# Patient Record
Sex: FEMALE
[De-identification: names, ages, dates, MRNs, and addresses within clinical notes are randomized per-mention and may not be internally consistent; named-entity substitution may affect disease eponyms.]

---

## 2019-06-16 ENCOUNTER — HOSPITAL ENCOUNTER (OUTPATIENT)
Dept: HOSPITAL 56 - MW.ED | Age: 49
Setting detail: OBSERVATION
LOS: 1 days | Discharge: HOME | End: 2019-06-17
Attending: INTERNAL MEDICINE | Admitting: INTERNAL MEDICINE
Payer: MEDICAID

## 2019-06-16 DIAGNOSIS — F17.210: ICD-10-CM

## 2019-06-16 DIAGNOSIS — F19.10: ICD-10-CM

## 2019-06-16 DIAGNOSIS — E86.0: ICD-10-CM

## 2019-06-16 DIAGNOSIS — G93.40: ICD-10-CM

## 2019-06-16 DIAGNOSIS — Z79.899: ICD-10-CM

## 2019-06-16 DIAGNOSIS — F41.9: ICD-10-CM

## 2019-06-16 DIAGNOSIS — Z23: ICD-10-CM

## 2019-06-16 DIAGNOSIS — N30.00: Primary | ICD-10-CM

## 2019-06-16 LAB
APAP SERPL-MCNC: <2 UG/ML
CHLORIDE SERPL-SCNC: 103 MMOL/L (ref 98–107)
SODIUM SERPL-SCNC: 137 MMOL/L (ref 136–145)

## 2019-06-16 PROCEDURE — 96372 THER/PROPH/DIAG INJ SC/IM: CPT

## 2019-06-16 PROCEDURE — 90715 TDAP VACCINE 7 YRS/> IM: CPT

## 2019-06-16 PROCEDURE — A4217 STERILE WATER/SALINE, 500 ML: HCPCS

## 2019-06-16 PROCEDURE — 71045 X-RAY EXAM CHEST 1 VIEW: CPT

## 2019-06-16 PROCEDURE — 96361 HYDRATE IV INFUSION ADD-ON: CPT

## 2019-06-16 PROCEDURE — 87389 HIV-1 AG W/HIV-1&-2 AB AG IA: CPT

## 2019-06-16 PROCEDURE — 85025 COMPLETE CBC W/AUTO DIFF WBC: CPT

## 2019-06-16 PROCEDURE — 90471 IMMUNIZATION ADMIN: CPT

## 2019-06-16 PROCEDURE — 80074 ACUTE HEPATITIS PANEL: CPT

## 2019-06-16 PROCEDURE — G0378 HOSPITAL OBSERVATION PER HR: HCPCS

## 2019-06-16 PROCEDURE — 80305 DRUG TEST PRSMV DIR OPT OBS: CPT

## 2019-06-16 PROCEDURE — 36415 COLL VENOUS BLD VENIPUNCTURE: CPT

## 2019-06-16 PROCEDURE — 96375 TX/PRO/DX INJ NEW DRUG ADDON: CPT

## 2019-06-16 PROCEDURE — 87088 URINE BACTERIA CULTURE: CPT

## 2019-06-16 PROCEDURE — 82553 CREATINE MB FRACTION: CPT

## 2019-06-16 PROCEDURE — 81001 URINALYSIS AUTO W/SCOPE: CPT

## 2019-06-16 PROCEDURE — 96365 THER/PROPH/DIAG IV INF INIT: CPT

## 2019-06-16 PROCEDURE — G0480 DRUG TEST DEF 1-7 CLASSES: HCPCS

## 2019-06-16 PROCEDURE — 87186 SC STD MICRODIL/AGAR DIL: CPT

## 2019-06-16 PROCEDURE — 87040 BLOOD CULTURE FOR BACTERIA: CPT

## 2019-06-16 PROCEDURE — 99285 EMERGENCY DEPT VISIT HI MDM: CPT

## 2019-06-16 PROCEDURE — 70450 CT HEAD/BRAIN W/O DYE: CPT

## 2019-06-16 PROCEDURE — 96366 THER/PROPH/DIAG IV INF ADDON: CPT

## 2019-06-16 PROCEDURE — 80053 COMPREHEN METABOLIC PANEL: CPT

## 2019-06-16 PROCEDURE — 87086 URINE CULTURE/COLONY COUNT: CPT

## 2019-06-16 PROCEDURE — 85610 PROTHROMBIN TIME: CPT

## 2019-06-16 RX ADMIN — DEXTROSE SCH: 10 SOLUTION INTRAVENOUS at 15:46

## 2019-06-16 RX ADMIN — DEXTROSE SCH: 10 SOLUTION INTRAVENOUS at 22:23

## 2019-06-16 NOTE — EDM.PDOC
ED HPI GENERAL MEDICAL PROBLEM





- General


Chief Complaint: General


Stated Complaint: MENTAL EVAL


Time Seen by Provider: 06/16/19 10:16


Source of Information: Reports: Patient


History Limitations: Reports: No Limitations





- History of Present Illness


INITIAL COMMENTS - FREE TEXT/NARRATIVE: 


HISTORY AND PHYSICAL:





History of present illness:


Patient is brought to the emergency room by ambulance and enforcement after 

[place]. The  reports she was 

attempting to get into other people's homes. Upon EMS arrival patient is 

ambulatory, alert but not answering questions or answering them appropriately. 

Uncooperative and resisting assistance. First responders did note she was 

walking around barefoot and does have some scattered bruising throughout her 

face and body. Voiced concern of drug abuse. Unable to get full history or 

current concerns from patient at this time.





Review of systems: 


As per history of present illness and below otherwise all systems reviewed and 

negative.





Past medical history: 


As per history of present illness and as reviewed below otherwise 

noncontributory.





Surgical history: 


As per history of present illness and as reviewed below otherwise 

noncontributory.





Social history: 


See social history for further information





Family history: 


As per history of present illness and as reviewed below otherwise 

noncontributory.





Physical exam:


General: Thin and unkempt female. Difficult to assess due to patient's limited 

verbal response. Vital signs are stable and have been reviewed by me.


HEENT: Bruising to face, nontender, normocephalic, pupils equal and reactive 

bilaterally, negative for conjunctival pallor or scleral icterus, mucous 

membranes moist, TMs normal bilaterally, throat clear, neck supple, nontender, 

trachea midline. No drooling or trismus noted. No meningeal signs. No hot 

potato voice noted. 


Lungs: Clear to auscultation, breath sounds equal bilaterally, chest nontender.


Heart: S1S2, regular rate and rhythm without overt murmur


Abdomen: Soft, nondistended, extremely thin, nontender. Negative for masses or 

hepatosplenomegaly. Negative for costovertebral tenderness.


Pelvis: Stable nontender.


Genitourinary/Rectal: Deferred.


Skin: Various scattered bruising throughout. No lesions or rashes noted.


C-spine/Back: No pinpoint vertebral tenderness upon palpation. No crepitus, step

-offs or obvious deformities. Patient was ambulatory prior to arrival. She is 

not incontinent of urine or stool.


Extremities: Atraumatic, moves all extremities per self without difficulty or 

deficits, negative for cords or calf pain. Neurovascular unremarkable.


Neuro: Awake, alert, oriented. Cranial nerves II through XII unremarkable. 

Cerebellum unremarkable. Motor and sensory unremarkable throughout. Exam 

nonfocal.





Notes:


Patient is restless and uncooperative with allowing nursing staff to assist 

her. She is aware and agreeable for lab work, imaging and IV medications.





Lab work shows that she does have a urinary tract infection. We'll hang IV 

Rocephin. Patient's chest x-ray and head CT showed no acute findings. Patient 

continues to be restless and non-cooperative with answering questions. Vital 

signs remain stable.





Diagnostics:


CBC, CMP, INR, UA, Drug Screening, Acetaminophen, Salicylate, chest x-ray, head 

CT





Therapeutics:


IV fluids, Benadryl, Haldol, Rocephin





Impression: 


Altered Mental Status


Substance Use


Encounter for Medical Screening


UTI





Plan:


Observation admission to ICU





Definitive disposition and diagnosis as appropriate pending reevaluation and 

review of above.








- Related Data


 Allergies











Allergy/AdvReac Type Severity Reaction Status Date / Time


 


No Known Allergies Allergy   Verified 06/16/19 10:28











Home Meds: 


 Home Meds





busPIRone [Buspar] 10 mg PO 06/16/19 [History]











Past Medical History


HEENT History: Reports: None


Cardiovascular History: Reports: None


Respiratory History: Reports: None


Gastrointestinal History: Reports: None


Genitourinary History: Reports: None


OB/GYN History: Reports: None


Musculoskeletal History: Reports: None


Neurological History: Reports: None


Psychiatric History: Reports: Anxiety


Endocrine/Metabolic History: Reports: None


Hematologic History: Reports: None


Immunologic History: Reports: None


Oncologic (Cancer) History: Reports: None


Dermatologic History: Reports: None





- Infectious Disease History


Infectious Disease History: Reports: None





- Past Surgical History


Head Surgeries/Procedures: Reports: None


Female  Surgical History: Reports: None





Social & Family History





- Tobacco Use


Smoking Status *Q: Current Some Day Smoker


Years of Tobacco use: 20


Packs/Tins Daily: 1





- Caffeine Use


Caffeine Use: Reports: None





- Alcohol Use


Days Per Week of Alcohol Use: 7


Number of Drinks Per Day: 2


Total Drinks Per Week: 14





- Recreational Drug Use


Recreational Drug Use: Yes





ED ROS GENERAL





- Review of Systems


Review Of Systems: ROS reveals no pertinent complaints other than HPI.





ED EXAM, GENERAL





- Physical Exam


Exam: See Below (See dictation)





Course





- Vital Signs


Last Recorded V/S: 


 Last Vital Signs











Temp      


 


Pulse  70   06/16/19 10:51


 


Resp  18   06/16/19 10:51


 


BP  124/74   06/16/19 10:51


 


Pulse Ox  95   06/16/19 10:51














- Orders/Labs/Meds


Orders: 


 Active Orders 24 hr











 Category Date Time Status


 


 Admission Status [Patient Status] [ADT] Stat ADT  06/16/19 12:19 Active


 


 Vaccines to be Administered [RC] PER UNIT ROUTINE Care  06/16/19 10:19 Active


 


 CULTURE URINE [RM] Stat Lab  06/16/19 11:06 Received











Labs: 


 Laboratory Tests











  06/16/19 06/16/19 06/16/19 Range/Units





  10:23 10:23 10:23 


 


WBC  8.55    (4.0-11.0)  K/uL


 


RBC  4.43    (4.30-5.90)  M/uL


 


Hgb  12.1    (12.0-16.0)  g/dL


 


Hct  36.9    (36.0-46.0)  %


 


MCV  83.3    (80.0-98.0)  fL


 


MCH  27.3    (27.0-32.0)  pg


 


MCHC  32.8    (31.0-37.0)  g/dL


 


RDW Std Deviation  55.7    (28.0-62.0)  fl


 


RDW Coeff of Crow  18 H    (11.0-15.0)  %


 


Plt Count  296    (150-400)  K/uL


 


MPV  9.20    (7.40-12.00)  fL


 


Neut % (Auto)  63.3    (48.0-80.0)  %


 


Lymph % (Auto)  27.1    (16.0-40.0)  %


 


Mono % (Auto)  8.5    (0.0-15.0)  %


 


Eos % (Auto)  0.7    (0.0-7.0)  %


 


Baso % (Auto)  0.4    (0.0-1.5)  %


 


Neut # (Auto)  5.4    (1.4-5.7)  K/uL


 


Lymph # (Auto)  2.3    (0.6-2.4)  K/uL


 


Mono # (Auto)  0.7    (0.0-0.8)  K/uL


 


Eos # (Auto)  0.1    (0.0-0.7)  K/uL


 


Baso # (Auto)  0.0    (0.0-0.1)  K/uL


 


Nucleated RBC %  0.0    /100WBC


 


Nucleated RBCs #  0    K/uL


 


INR   1.01   


 


Sodium    137  (136-145)  mmol/L


 


Potassium    4.3  (3.5-5.1)  mmol/L


 


Chloride    103  ()  mmol/L


 


Carbon Dioxide    26.9  (21.0-32.0)  mmol/L


 


BUN    23 H  (7.0-18.0)  mg/dL


 


Creatinine    1.0  (0.6-1.0)  mg/dL


 


Est Cr Clr Drug Dosing    15.27  mL/min


 


Estimated GFR (MDRD)    49.2  ml/min


 


Glucose    102  ()  mg/dL


 


Calcium    9.3  (8.5-10.1)  mg/dL


 


Total Bilirubin    1.2 H  (0.2-1.0)  mg/dL


 


AST    26  (15-37)  IU/L


 


ALT    28  (14-63)  IU/L


 


Alkaline Phosphatase    121 H  ()  U/L


 


Total Protein    7.6  (6.4-8.2)  g/dL


 


Albumin    4.3  (3.4-5.0)  g/dL


 


Globulin    3.3  (2.6-4.0)  g/dL


 


Albumin/Globulin Ratio    1.3  (0.9-1.6)  


 


Urine Color     


 


Urine Appearance     


 


Urine pH     (5.0-8.0)  


 


Ur Specific Gravity     (1.001-1.035)  


 


Urine Protein     (NEGATIVE)  mg/dL


 


Urine Glucose (UA)     (NEGATIVE)  mg/dL


 


Urine Ketones     (NEGATIVE)  mg/dL


 


Urine Occult Blood     (NEGATIVE)  


 


Urine Nitrite     (NEGATIVE)  


 


Urine Bilirubin     (NEGATIVE)  


 


Urine Urobilinogen     (<2.0)  EU/dL


 


Ur Leukocyte Esterase     (NEGATIVE)  


 


Urine RBC     (0-2/HPF)  


 


Urine WBC     (0-5/HPF)  


 


Amorphous Sediment     (NEGATIVE)  


 


Urine Bacteria     (NEGATIVE)  


 


Salicylates     (0-20)  mg/dL


 


Urine Opiates Screen     (NEGATIVE)  


 


Ur Oxycodone Screen     (NEGATIVE)  


 


Urine Methadone Screen     (NEGATIVE)  


 


Acetaminophen     ug/mL


 


Ur Barbiturates Screen     (NEGATIVE)  


 


Ur Phencyclidine Scrn     (NEGATIVE)  


 


Ur Amphetamine Screen     (NEGATIVE)  


 


U Methamphetamines Scrn     (NEGATIVE)  


 


U Benzodiazepines Scrn     (NEGATIVE)  


 


U Cocaine Metab Screen     (NEGATIVE)  


 


U Marijuana (THC) Screen     (NEGATIVE)  


 


Ethyl Alcohol     mg/dL














  06/16/19 06/16/19 06/16/19 Range/Units





  10:23 10:23 11:06 


 


WBC     (4.0-11.0)  K/uL


 


RBC     (4.30-5.90)  M/uL


 


Hgb     (12.0-16.0)  g/dL


 


Hct     (36.0-46.0)  %


 


MCV     (80.0-98.0)  fL


 


MCH     (27.0-32.0)  pg


 


MCHC     (31.0-37.0)  g/dL


 


RDW Std Deviation     (28.0-62.0)  fl


 


RDW Coeff of Crow     (11.0-15.0)  %


 


Plt Count     (150-400)  K/uL


 


MPV     (7.40-12.00)  fL


 


Neut % (Auto)     (48.0-80.0)  %


 


Lymph % (Auto)     (16.0-40.0)  %


 


Mono % (Auto)     (0.0-15.0)  %


 


Eos % (Auto)     (0.0-7.0)  %


 


Baso % (Auto)     (0.0-1.5)  %


 


Neut # (Auto)     (1.4-5.7)  K/uL


 


Lymph # (Auto)     (0.6-2.4)  K/uL


 


Mono # (Auto)     (0.0-0.8)  K/uL


 


Eos # (Auto)     (0.0-0.7)  K/uL


 


Baso # (Auto)     (0.0-0.1)  K/uL


 


Nucleated RBC %     /100WBC


 


Nucleated RBCs #     K/uL


 


INR     


 


Sodium     (136-145)  mmol/L


 


Potassium     (3.5-5.1)  mmol/L


 


Chloride     ()  mmol/L


 


Carbon Dioxide     (21.0-32.0)  mmol/L


 


BUN     (7.0-18.0)  mg/dL


 


Creatinine     (0.6-1.0)  mg/dL


 


Est Cr Clr Drug Dosing     mL/min


 


Estimated GFR (MDRD)     ml/min


 


Glucose     ()  mg/dL


 


Calcium     (8.5-10.1)  mg/dL


 


Total Bilirubin     (0.2-1.0)  mg/dL


 


AST     (15-37)  IU/L


 


ALT     (14-63)  IU/L


 


Alkaline Phosphatase     ()  U/L


 


Total Protein     (6.4-8.2)  g/dL


 


Albumin     (3.4-5.0)  g/dL


 


Globulin     (2.6-4.0)  g/dL


 


Albumin/Globulin Ratio     (0.9-1.6)  


 


Urine Color    YELLOW  


 


Urine Appearance    CLEAR  


 


Urine pH    7.0  (5.0-8.0)  


 


Ur Specific Gravity    1.015  (1.001-1.035)  


 


Urine Protein    NEGATIVE  (NEGATIVE)  mg/dL


 


Urine Glucose (UA)    NEGATIVE  (NEGATIVE)  mg/dL


 


Urine Ketones    TRACE H  (NEGATIVE)  mg/dL


 


Urine Occult Blood    NEGATIVE  (NEGATIVE)  


 


Urine Nitrite    POSITIVE H  (NEGATIVE)  


 


Urine Bilirubin    NEGATIVE  (NEGATIVE)  


 


Urine Urobilinogen    0.2  (<2.0)  EU/dL


 


Ur Leukocyte Esterase    SMALL H  (NEGATIVE)  


 


Urine RBC    NONE SEEN  (0-2/HPF)  


 


Urine WBC    15-25  (0-5/HPF)  


 


Amorphous Sediment    MODERATE  (NEGATIVE)  


 


Urine Bacteria    4+ H  (NEGATIVE)  


 


Salicylates   4.8   (0-20)  mg/dL


 


Urine Opiates Screen     (NEGATIVE)  


 


Ur Oxycodone Screen     (NEGATIVE)  


 


Urine Methadone Screen     (NEGATIVE)  


 


Acetaminophen   <2.0   ug/mL


 


Ur Barbiturates Screen     (NEGATIVE)  


 


Ur Phencyclidine Scrn     (NEGATIVE)  


 


Ur Amphetamine Screen     (NEGATIVE)  


 


U Methamphetamines Scrn     (NEGATIVE)  


 


U Benzodiazepines Scrn     (NEGATIVE)  


 


U Cocaine Metab Screen     (NEGATIVE)  


 


U Marijuana (THC) Screen     (NEGATIVE)  


 


Ethyl Alcohol  < 3.0    mg/dL














  06/16/19 Range/Units





  11:06 


 


WBC   (4.0-11.0)  K/uL


 


RBC   (4.30-5.90)  M/uL


 


Hgb   (12.0-16.0)  g/dL


 


Hct   (36.0-46.0)  %


 


MCV   (80.0-98.0)  fL


 


MCH   (27.0-32.0)  pg


 


MCHC   (31.0-37.0)  g/dL


 


RDW Std Deviation   (28.0-62.0)  fl


 


RDW Coeff of Crow   (11.0-15.0)  %


 


Plt Count   (150-400)  K/uL


 


MPV   (7.40-12.00)  fL


 


Neut % (Auto)   (48.0-80.0)  %


 


Lymph % (Auto)   (16.0-40.0)  %


 


Mono % (Auto)   (0.0-15.0)  %


 


Eos % (Auto)   (0.0-7.0)  %


 


Baso % (Auto)   (0.0-1.5)  %


 


Neut # (Auto)   (1.4-5.7)  K/uL


 


Lymph # (Auto)   (0.6-2.4)  K/uL


 


Mono # (Auto)   (0.0-0.8)  K/uL


 


Eos # (Auto)   (0.0-0.7)  K/uL


 


Baso # (Auto)   (0.0-0.1)  K/uL


 


Nucleated RBC %   /100WBC


 


Nucleated RBCs #   K/uL


 


INR   


 


Sodium   (136-145)  mmol/L


 


Potassium   (3.5-5.1)  mmol/L


 


Chloride   ()  mmol/L


 


Carbon Dioxide   (21.0-32.0)  mmol/L


 


BUN   (7.0-18.0)  mg/dL


 


Creatinine   (0.6-1.0)  mg/dL


 


Est Cr Clr Drug Dosing   mL/min


 


Estimated GFR (MDRD)   ml/min


 


Glucose   ()  mg/dL


 


Calcium   (8.5-10.1)  mg/dL


 


Total Bilirubin   (0.2-1.0)  mg/dL


 


AST   (15-37)  IU/L


 


ALT   (14-63)  IU/L


 


Alkaline Phosphatase   ()  U/L


 


Total Protein   (6.4-8.2)  g/dL


 


Albumin   (3.4-5.0)  g/dL


 


Globulin   (2.6-4.0)  g/dL


 


Albumin/Globulin Ratio   (0.9-1.6)  


 


Urine Color   


 


Urine Appearance   


 


Urine pH   (5.0-8.0)  


 


Ur Specific Gravity   (1.001-1.035)  


 


Urine Protein   (NEGATIVE)  mg/dL


 


Urine Glucose (UA)   (NEGATIVE)  mg/dL


 


Urine Ketones   (NEGATIVE)  mg/dL


 


Urine Occult Blood   (NEGATIVE)  


 


Urine Nitrite   (NEGATIVE)  


 


Urine Bilirubin   (NEGATIVE)  


 


Urine Urobilinogen   (<2.0)  EU/dL


 


Ur Leukocyte Esterase   (NEGATIVE)  


 


Urine RBC   (0-2/HPF)  


 


Urine WBC   (0-5/HPF)  


 


Amorphous Sediment   (NEGATIVE)  


 


Urine Bacteria   (NEGATIVE)  


 


Salicylates   (0-20)  mg/dL


 


Urine Opiates Screen  NEGATIVE  (NEGATIVE)  


 


Ur Oxycodone Screen  NEGATIVE  (NEGATIVE)  


 


Urine Methadone Screen  NEGATIVE  (NEGATIVE)  


 


Acetaminophen   ug/mL


 


Ur Barbiturates Screen  NEGATIVE  (NEGATIVE)  


 


Ur Phencyclidine Scrn  NEGATIVE  (NEGATIVE)  


 


Ur Amphetamine Screen  NEGATIVE  (NEGATIVE)  


 


U Methamphetamines Scrn  POSITIVE  (NEGATIVE)  


 


U Benzodiazepines Scrn  NEGATIVE  (NEGATIVE)  


 


U Cocaine Metab Screen  NEGATIVE  (NEGATIVE)  


 


U Marijuana (THC) Screen  NEGATIVE  (NEGATIVE)  


 


Ethyl Alcohol   mg/dL











Meds: 


Medications














Discontinued Medications














Generic Name Dose Route Start Last Admin





  Trade Name Freq  PRN Reason Stop Dose Admin


 


Diphenhydramine HCl  50 mg  06/16/19 10:28  06/16/19 10:41





  Benadryl  IVPUSH  06/16/19 10:29  50 mg





  ONETIME ONE   Administration





     





     





     





     


 


Diphtheria/Tetanus/Acell Pertussis  0.5 ml  06/16/19 10:18  06/16/19 10:41





  Adacel  IM  06/16/19 10:19  0.5 ml





  .ONCE ONE   Administration





     





     





     





     


 


Haloperidol Lactate  10 mg  06/16/19 10:29  06/16/19 10:49





  Haldol  IM  06/16/19 10:30  10 mg





  ONETIME ONE   Administration





     





     





     





     


 


Sodium Chloride  1,000 mls @ 999 mls/hr  06/16/19 10:15  06/16/19 10:30





  Normal Saline  IV  06/16/19 11:15  999 mls/hr





  STAT ONE   Administration





     





     





     





     


 


Ceftriaxone Sodium/Dextrose 1  50 mls @ 100 mls/hr  06/16/19 11:38  06/16/19 12:

17





  gm/ Premix  IV  06/16/19 12:07  100 mls/hr





  ONETIME ONE   Administration





     





     





     





     














Departure





- Departure


Time of Disposition: 12:53


Disposition: Refer to Observation


Clinical Impression: 


 Substance abuse, Encounter for medical screening examination





Altered mental status


Qualifiers:


 Altered mental status type: unspecified Qualified Code(s): R41.82 - Altered 

mental status, unspecified





UTI (urinary tract infection)


Qualifiers:


 Urinary tract infection type: acute cystitis Hematuria presence: without 

hematuria Qualified Code(s): N30.00 - Acute cystitis without hematuria








- Discharge Information





- My Orders


Last 24 Hours: 


My Active Orders





06/16/19 10:19


Vaccines to be Administered [RC] PER UNIT ROUTINE 





06/16/19 11:06


CULTURE URINE [RM] Stat 





06/16/19 12:19


Admission Status [Patient Status] [ADT] Stat 














- Assessment/Plan


Last 24 Hours: 


My Active Orders





06/16/19 10:19


Vaccines to be Administered [RC] PER UNIT ROUTINE 





06/16/19 11:06


CULTURE URINE [RM] Stat 





06/16/19 12:19


Admission Status [Patient Status] [ADT] Stat

## 2019-06-16 NOTE — PCM.HP
<Gabriel Hogan - Last Filed: 06/16/19 14:20>





H&P History of Present Illness





- General


Date of Service: 06/16/19


Admit Problem/Dx: 


 Admission Diagnosis/Problem





Admission Diagnosis/Problem      Altered mental status











- History of Present Illness


Initial Comments - Free Text/Narative: 





59 y/o female with history of polysubstance abuse who was brought in to the ER 

by 's Department who found her wondering [place] trying 

to break in. Initially, she was altered when she presented to the ER. However, 

after some time in the ER, she became more alert and oriented. She states she 

lives in [place], [place] and came to [place] with her  to get his car. 

In the ER, she urine drug screen was positive for Methamphetamine. In addition, 

she was found to have a UTI, cultures pending. 


Denies any headaches, chest pain, dyspnea, abdominal pain, dysuria, diarrhea.





- Related Data


Allergies/Adverse Reactions: 


 Allergies











Allergy/AdvReac Type Severity Reaction Status Date / Time


 


No Known Allergies Allergy   Verified 06/16/19 10:28











Home Medications: 


 Home Meds





busPIRone [Buspar] 10 mg PO 06/16/19 [History]











Past Medical History


HEENT History: Reports: None


Cardiovascular History: Reports: None


Respiratory History: Reports: None


Gastrointestinal History: Reports: None


Genitourinary History: Reports: None


OB/GYN History: Reports: None


Musculoskeletal History: Reports: None


Neurological History: Reports: None


Psychiatric History: Reports: Anxiety


Endocrine/Metabolic History: Reports: None


Hematologic History: Reports: None


Immunologic History: Reports: None


Oncologic (Cancer) History: Reports: None


Dermatologic History: Reports: None





- Infectious Disease History


Infectious Disease History: Reports: None





- Past Surgical History


Head Surgeries/Procedures: Reports: None


Female  Surgical History: Reports: None





Social & Family History





- Tobacco Use


Smoking Status *Q: Current Some Day Smoker


Years of Tobacco use: 20


Packs/Tins Daily: 1





- Caffeine Use


Caffeine Use: Reports: None





- Alcohol Use


Days Per Week of Alcohol Use: 7


Number of Drinks Per Day: 2


Total Drinks Per Week: 14





- Recreational Drug Use


Recreational Drug Use: Yes





H&P Review of Systems





- Review of Systems:


Review Of Systems: ROS reveals no pertinent complaints other than HPI.





Exam





- Exam


Exam: See Below





- Vital Signs


Vital Signs: 


 Last Vital Signs











Temp      


 


Pulse  70   06/16/19 10:51


 


Resp  18   06/16/19 10:51


 


BP  124/74   06/16/19 10:51


 


Pulse Ox  95   06/16/19 10:51











Weight: 61.689 kg





- Exam


General: Alert, Cooperative


HEENT: Other (dry oral mucosa with poor dentition)


Lungs: Clear to Auscultation, Normal Respiratory Effort.  No: Crackles, Wheezing


Cardiovascular: Regular Rate, Regular Rhythm


GI/Abdominal Exam: Normal Bowel Sounds, Soft, Non-Tender, No Distention


Extremities: No Pedal Edema, Other (there are some scratches on her lower 

extremities)





- Patient Data


Lab Results Last 24 hrs: 


 Laboratory Results - last 24 hr











  06/16/19 06/16/19 06/16/19 Range/Units





  10:23 10:23 10:23 


 


WBC  8.55    (4.0-11.0)  K/uL


 


RBC  4.43    (4.30-5.90)  M/uL


 


Hgb  12.1    (12.0-16.0)  g/dL


 


Hct  36.9    (36.0-46.0)  %


 


MCV  83.3    (80.0-98.0)  fL


 


MCH  27.3    (27.0-32.0)  pg


 


MCHC  32.8    (31.0-37.0)  g/dL


 


RDW Std Deviation  55.7    (28.0-62.0)  fl


 


RDW Coeff of Crow  18 H    (11.0-15.0)  %


 


Plt Count  296    (150-400)  K/uL


 


MPV  9.20    (7.40-12.00)  fL


 


Neut % (Auto)  63.3    (48.0-80.0)  %


 


Lymph % (Auto)  27.1    (16.0-40.0)  %


 


Mono % (Auto)  8.5    (0.0-15.0)  %


 


Eos % (Auto)  0.7    (0.0-7.0)  %


 


Baso % (Auto)  0.4    (0.0-1.5)  %


 


Neut # (Auto)  5.4    (1.4-5.7)  K/uL


 


Lymph # (Auto)  2.3    (0.6-2.4)  K/uL


 


Mono # (Auto)  0.7    (0.0-0.8)  K/uL


 


Eos # (Auto)  0.1    (0.0-0.7)  K/uL


 


Baso # (Auto)  0.0    (0.0-0.1)  K/uL


 


Nucleated RBC %  0.0    /100WBC


 


Nucleated RBCs #  0    K/uL


 


INR   1.01   


 


Sodium    137  (136-145)  mmol/L


 


Potassium    4.3  (3.5-5.1)  mmol/L


 


Chloride    103  ()  mmol/L


 


Carbon Dioxide    26.9  (21.0-32.0)  mmol/L


 


BUN    23 H  (7.0-18.0)  mg/dL


 


Creatinine    1.0  (0.6-1.0)  mg/dL


 


Est Cr Clr Drug Dosing    15.27  mL/min


 


Estimated GFR (MDRD)    49.2  ml/min


 


Glucose    102  ()  mg/dL


 


Calcium    9.3  (8.5-10.1)  mg/dL


 


Total Bilirubin    1.2 H  (0.2-1.0)  mg/dL


 


AST    26  (15-37)  IU/L


 


ALT    28  (14-63)  IU/L


 


Alkaline Phosphatase    121 H  ()  U/L


 


Total Protein    7.6  (6.4-8.2)  g/dL


 


Albumin    4.3  (3.4-5.0)  g/dL


 


Globulin    3.3  (2.6-4.0)  g/dL


 


Albumin/Globulin Ratio    1.3  (0.9-1.6)  


 


Urine Color     


 


Urine Appearance     


 


Urine pH     (5.0-8.0)  


 


Ur Specific Gravity     (1.001-1.035)  


 


Urine Protein     (NEGATIVE)  mg/dL


 


Urine Glucose (UA)     (NEGATIVE)  mg/dL


 


Urine Ketones     (NEGATIVE)  mg/dL


 


Urine Occult Blood     (NEGATIVE)  


 


Urine Nitrite     (NEGATIVE)  


 


Urine Bilirubin     (NEGATIVE)  


 


Urine Urobilinogen     (<2.0)  EU/dL


 


Ur Leukocyte Esterase     (NEGATIVE)  


 


Urine RBC     (0-2/HPF)  


 


Urine WBC     (0-5/HPF)  


 


Amorphous Sediment     (NEGATIVE)  


 


Urine Bacteria     (NEGATIVE)  


 


Salicylates     (0-20)  mg/dL


 


Urine Opiates Screen     (NEGATIVE)  


 


Ur Oxycodone Screen     (NEGATIVE)  


 


Urine Methadone Screen     (NEGATIVE)  


 


Acetaminophen     ug/mL


 


Ur Barbiturates Screen     (NEGATIVE)  


 


Ur Phencyclidine Scrn     (NEGATIVE)  


 


Ur Amphetamine Screen     (NEGATIVE)  


 


U Methamphetamines Scrn     (NEGATIVE)  


 


U Benzodiazepines Scrn     (NEGATIVE)  


 


U Cocaine Metab Screen     (NEGATIVE)  


 


U Marijuana (THC) Screen     (NEGATIVE)  


 


Ethyl Alcohol     mg/dL














  06/16/19 06/16/19 06/16/19 Range/Units





  10:23 10:23 11:06 


 


WBC     (4.0-11.0)  K/uL


 


RBC     (4.30-5.90)  M/uL


 


Hgb     (12.0-16.0)  g/dL


 


Hct     (36.0-46.0)  %


 


MCV     (80.0-98.0)  fL


 


MCH     (27.0-32.0)  pg


 


MCHC     (31.0-37.0)  g/dL


 


RDW Std Deviation     (28.0-62.0)  fl


 


RDW Coeff of Crow     (11.0-15.0)  %


 


Plt Count     (150-400)  K/uL


 


MPV     (7.40-12.00)  fL


 


Neut % (Auto)     (48.0-80.0)  %


 


Lymph % (Auto)     (16.0-40.0)  %


 


Mono % (Auto)     (0.0-15.0)  %


 


Eos % (Auto)     (0.0-7.0)  %


 


Baso % (Auto)     (0.0-1.5)  %


 


Neut # (Auto)     (1.4-5.7)  K/uL


 


Lymph # (Auto)     (0.6-2.4)  K/uL


 


Mono # (Auto)     (0.0-0.8)  K/uL


 


Eos # (Auto)     (0.0-0.7)  K/uL


 


Baso # (Auto)     (0.0-0.1)  K/uL


 


Nucleated RBC %     /100WBC


 


Nucleated RBCs #     K/uL


 


INR     


 


Sodium     (136-145)  mmol/L


 


Potassium     (3.5-5.1)  mmol/L


 


Chloride     ()  mmol/L


 


Carbon Dioxide     (21.0-32.0)  mmol/L


 


BUN     (7.0-18.0)  mg/dL


 


Creatinine     (0.6-1.0)  mg/dL


 


Est Cr Clr Drug Dosing     mL/min


 


Estimated GFR (MDRD)     ml/min


 


Glucose     ()  mg/dL


 


Calcium     (8.5-10.1)  mg/dL


 


Total Bilirubin     (0.2-1.0)  mg/dL


 


AST     (15-37)  IU/L


 


ALT     (14-63)  IU/L


 


Alkaline Phosphatase     ()  U/L


 


Total Protein     (6.4-8.2)  g/dL


 


Albumin     (3.4-5.0)  g/dL


 


Globulin     (2.6-4.0)  g/dL


 


Albumin/Globulin Ratio     (0.9-1.6)  


 


Urine Color    YELLOW  


 


Urine Appearance    CLEAR  


 


Urine pH    7.0  (5.0-8.0)  


 


Ur Specific Gravity    1.015  (1.001-1.035)  


 


Urine Protein    NEGATIVE  (NEGATIVE)  mg/dL


 


Urine Glucose (UA)    NEGATIVE  (NEGATIVE)  mg/dL


 


Urine Ketones    TRACE H  (NEGATIVE)  mg/dL


 


Urine Occult Blood    NEGATIVE  (NEGATIVE)  


 


Urine Nitrite    POSITIVE H  (NEGATIVE)  


 


Urine Bilirubin    NEGATIVE  (NEGATIVE)  


 


Urine Urobilinogen    0.2  (<2.0)  EU/dL


 


Ur Leukocyte Esterase    SMALL H  (NEGATIVE)  


 


Urine RBC    NONE SEEN  (0-2/HPF)  


 


Urine WBC    15-25  (0-5/HPF)  


 


Amorphous Sediment    MODERATE  (NEGATIVE)  


 


Urine Bacteria    4+ H  (NEGATIVE)  


 


Salicylates   4.8   (0-20)  mg/dL


 


Urine Opiates Screen     (NEGATIVE)  


 


Ur Oxycodone Screen     (NEGATIVE)  


 


Urine Methadone Screen     (NEGATIVE)  


 


Acetaminophen   <2.0   ug/mL


 


Ur Barbiturates Screen     (NEGATIVE)  


 


Ur Phencyclidine Scrn     (NEGATIVE)  


 


Ur Amphetamine Screen     (NEGATIVE)  


 


U Methamphetamines Scrn     (NEGATIVE)  


 


U Benzodiazepines Scrn     (NEGATIVE)  


 


U Cocaine Metab Screen     (NEGATIVE)  


 


U Marijuana (THC) Screen     (NEGATIVE)  


 


Ethyl Alcohol  < 3.0    mg/dL














  06/16/19 Range/Units





  11:06 


 


WBC   (4.0-11.0)  K/uL


 


RBC   (4.30-5.90)  M/uL


 


Hgb   (12.0-16.0)  g/dL


 


Hct   (36.0-46.0)  %


 


MCV   (80.0-98.0)  fL


 


MCH   (27.0-32.0)  pg


 


MCHC   (31.0-37.0)  g/dL


 


RDW Std Deviation   (28.0-62.0)  fl


 


RDW Coeff of Crow   (11.0-15.0)  %


 


Plt Count   (150-400)  K/uL


 


MPV   (7.40-12.00)  fL


 


Neut % (Auto)   (48.0-80.0)  %


 


Lymph % (Auto)   (16.0-40.0)  %


 


Mono % (Auto)   (0.0-15.0)  %


 


Eos % (Auto)   (0.0-7.0)  %


 


Baso % (Auto)   (0.0-1.5)  %


 


Neut # (Auto)   (1.4-5.7)  K/uL


 


Lymph # (Auto)   (0.6-2.4)  K/uL


 


Mono # (Auto)   (0.0-0.8)  K/uL


 


Eos # (Auto)   (0.0-0.7)  K/uL


 


Baso # (Auto)   (0.0-0.1)  K/uL


 


Nucleated RBC %   /100WBC


 


Nucleated RBCs #   K/uL


 


INR   


 


Sodium   (136-145)  mmol/L


 


Potassium   (3.5-5.1)  mmol/L


 


Chloride   ()  mmol/L


 


Carbon Dioxide   (21.0-32.0)  mmol/L


 


BUN   (7.0-18.0)  mg/dL


 


Creatinine   (0.6-1.0)  mg/dL


 


Est Cr Clr Drug Dosing   mL/min


 


Estimated GFR (MDRD)   ml/min


 


Glucose   ()  mg/dL


 


Calcium   (8.5-10.1)  mg/dL


 


Total Bilirubin   (0.2-1.0)  mg/dL


 


AST   (15-37)  IU/L


 


ALT   (14-63)  IU/L


 


Alkaline Phosphatase   ()  U/L


 


Total Protein   (6.4-8.2)  g/dL


 


Albumin   (3.4-5.0)  g/dL


 


Globulin   (2.6-4.0)  g/dL


 


Albumin/Globulin Ratio   (0.9-1.6)  


 


Urine Color   


 


Urine Appearance   


 


Urine pH   (5.0-8.0)  


 


Ur Specific Gravity   (1.001-1.035)  


 


Urine Protein   (NEGATIVE)  mg/dL


 


Urine Glucose (UA)   (NEGATIVE)  mg/dL


 


Urine Ketones   (NEGATIVE)  mg/dL


 


Urine Occult Blood   (NEGATIVE)  


 


Urine Nitrite   (NEGATIVE)  


 


Urine Bilirubin   (NEGATIVE)  


 


Urine Urobilinogen   (<2.0)  EU/dL


 


Ur Leukocyte Esterase   (NEGATIVE)  


 


Urine RBC   (0-2/HPF)  


 


Urine WBC   (0-5/HPF)  


 


Amorphous Sediment   (NEGATIVE)  


 


Urine Bacteria   (NEGATIVE)  


 


Salicylates   (0-20)  mg/dL


 


Urine Opiates Screen  NEGATIVE  (NEGATIVE)  


 


Ur Oxycodone Screen  NEGATIVE  (NEGATIVE)  


 


Urine Methadone Screen  NEGATIVE  (NEGATIVE)  


 


Acetaminophen   ug/mL


 


Ur Barbiturates Screen  NEGATIVE  (NEGATIVE)  


 


Ur Phencyclidine Scrn  NEGATIVE  (NEGATIVE)  


 


Ur Amphetamine Screen  NEGATIVE  (NEGATIVE)  


 


U Methamphetamines Scrn  POSITIVE  (NEGATIVE)  


 


U Benzodiazepines Scrn  NEGATIVE  (NEGATIVE)  


 


U Cocaine Metab Screen  NEGATIVE  (NEGATIVE)  


 


U Marijuana (THC) Screen  NEGATIVE  (NEGATIVE)  


 


Ethyl Alcohol   mg/dL











Result Diagrams: 


 06/16/19 10:23





 06/16/19 10:23





*Q Meaningful Use (ADM)





- VTE *Q


VTE Mechanical Contraindications *Q: At Risk for Falls


Problem List Initiated/Reviewed/Updated: Yes


Orders Last 24hrs: 


 Active Orders 24 hr











 Category Date Time Status


 


 Admission Status [Patient Status] [ADT] Stat ADT  06/16/19 12:19 Active


 


 CIWAA Assessment [RC] Q1H Care  06/16/19 13:51 Active


 


 Cardiac Monitoring [RC] CONTINUOUS Care  06/16/19 13:56 Active


 


 Oxygen Therapy [RC] PRN Care  06/16/19 13:51 Active


 


 Up With Assistance [RC] ASDIRECTED Care  06/16/19 13:51 Active


 


 VTE/DVT Education [RC] PER UNIT ROUTINE Care  06/16/19 13:51 Active


 


 Vaccines to be Administered [RC] PER UNIT ROUTINE Care  06/16/19 10:19 Active


 


 Vital Signs [RC] Q4H Care  06/16/19 13:51 Active


 


 Regular Diet [DIET] Diet  06/16/19 Dinner Active


 


 CBC WITH AUTO DIFF [HEME] AM Lab  06/17/19 05:11 Ordered


 


 CKMB [CHEM] Routine Lab  06/16/19 13:51 Ordered


 


 COMPREHENSIVE METABOLIC PN,CMP [CHEM] AM Lab  06/17/19 05:11 Ordered


 


 CULTURE BLOOD [BC] Stat Lab  06/16/19 13:35 Received


 


 CULTURE BLOOD [BC] Stat Lab  06/16/19 13:50 Received


 


 CULTURE URINE [RM] Stat Lab  06/16/19 11:06 Received


 


 HEPATITIS PANEL (4) [REF] Stat Lab  06/16/19 13:50 Received


 


 HIV12 AG/AB 4TH GEN [CHEM] Stat Lab  06/16/19 13:50 Received


 


 Acetaminophen [Tylenol] Med  06/16/19 13:51 Active





 650 mg PO Q4H PRN   


 


 Haloperidol Lactate [Haldol] Med  06/16/19 13:51 Active





 10 mg IM Q8H PRN   


 


 Heparin Sodium Med  06/16/19 14:00 Active





 5,000 units SUBCUT Q8H   


 


 Ondansetron [Zofran] Med  06/16/19 13:51 Active





 4 mg IVPUSH Q6H PRN   


 


 Sodium Chloride 0.9% [Normal Saline] 1,000 ml Med  06/16/19 14:00 Active





 IV ASDIRECTED   


 


 Temazepam [Restoril] Med  06/16/19 13:51 Active





 15 mg PO BEDTIME PRN   


 


 Blood Culture x2 Reflex Set [OM.PC] Stat Oth  06/16/19 13:34 Ordered


 


 VTE Mechanical Contraindications [AST] Per Unit Routine Oth  06/16/19 13:51 

Ordered


 


 Resuscitation Status Routine Resus Stat  06/16/19 13:51 Ordered








 Medication Orders





Acetaminophen (Tylenol)  650 mg PO Q4H PRN


   PRN Reason: Pain (Mild 1-3)/fever


Haloperidol Lactate (Haldol)  10 mg IM Q8H PRN


   PRN Reason: Agitation


Heparin Sodium (Porcine) (Heparin Sodium)  5,000 units SUBCUT Q8H MONSERRAT


Sodium Chloride (Normal Saline)  1,000 mls @ 125 mls/hr IV ASDIRECTED MONSERRAT


Ondansetron HCl (Zofran)  4 mg IVPUSH Q6H PRN


   PRN Reason: Nausea/Vomiting


Temazepam (Restoril)  15 mg PO BEDTIME PRN


   PRN Reason: Sleep








Assessment/Plan Comment:: 





A:


1. Acute encephalopathy


2. UTI


3. Polysubstance abuse





P:


1. Acute encephalopathy, suspect 2/2 polysubstance abuse and UTI. Ordered HIV, 

Hep panel. Haldol PRN for agitation.


2. UTI, pending urine culture results. continue ceftriaxone.


3. Polysubstance abuse- CIWA monitoring and Haldol PRN.





Dispo: like DC tomorrow.





<Zack Newby - Last Filed: 06/16/19 14:26>





H&P History of Present Illness





- General


Admit Problem/Dx: 


 Admission Diagnosis/Problem





Admission Diagnosis/Problem      Altered mental status





I have seen and examined to patient independently of medical resident, Gabriel Thompson MD. I have discussed the case for care of this patient with him. I 

have reviewed and approve of the plan of care as outlined by medical resident. 

Please see orders. 








Exam





- Vital Signs


Vital Signs: 


 Last Vital Signs











Temp      


 


Pulse  70   06/16/19 10:51


 


Resp  18   06/16/19 10:51


 


BP  124/74   06/16/19 10:51


 


Pulse Ox  95   06/16/19 10:51














- Patient Data


Lab Results Last 24 hrs: 


 Laboratory Results - last 24 hr











  06/16/19 06/16/19 06/16/19 Range/Units





  10:23 10:23 10:23 


 


WBC  8.55    (4.0-11.0)  K/uL


 


RBC  4.43    (4.30-5.90)  M/uL


 


Hgb  12.1    (12.0-16.0)  g/dL


 


Hct  36.9    (36.0-46.0)  %


 


MCV  83.3    (80.0-98.0)  fL


 


MCH  27.3    (27.0-32.0)  pg


 


MCHC  32.8    (31.0-37.0)  g/dL


 


RDW Std Deviation  55.7    (28.0-62.0)  fl


 


RDW Coeff of Crow  18 H    (11.0-15.0)  %


 


Plt Count  296    (150-400)  K/uL


 


MPV  9.20    (7.40-12.00)  fL


 


Neut % (Auto)  63.3    (48.0-80.0)  %


 


Lymph % (Auto)  27.1    (16.0-40.0)  %


 


Mono % (Auto)  8.5    (0.0-15.0)  %


 


Eos % (Auto)  0.7    (0.0-7.0)  %


 


Baso % (Auto)  0.4    (0.0-1.5)  %


 


Neut # (Auto)  5.4    (1.4-5.7)  K/uL


 


Lymph # (Auto)  2.3    (0.6-2.4)  K/uL


 


Mono # (Auto)  0.7    (0.0-0.8)  K/uL


 


Eos # (Auto)  0.1    (0.0-0.7)  K/uL


 


Baso # (Auto)  0.0    (0.0-0.1)  K/uL


 


Nucleated RBC %  0.0    /100WBC


 


Nucleated RBCs #  0    K/uL


 


INR   1.01   


 


Sodium    137  (136-145)  mmol/L


 


Potassium    4.3  (3.5-5.1)  mmol/L


 


Chloride    103  ()  mmol/L


 


Carbon Dioxide    26.9  (21.0-32.0)  mmol/L


 


BUN    23 H  (7.0-18.0)  mg/dL


 


Creatinine    1.0  (0.6-1.0)  mg/dL


 


Est Cr Clr Drug Dosing    15.27  mL/min


 


Estimated GFR (MDRD)    49.2  ml/min


 


Glucose    102  ()  mg/dL


 


Calcium    9.3  (8.5-10.1)  mg/dL


 


Total Bilirubin    1.2 H  (0.2-1.0)  mg/dL


 


AST    26  (15-37)  IU/L


 


ALT    28  (14-63)  IU/L


 


Alkaline Phosphatase    121 H  ()  U/L


 


Total Protein    7.6  (6.4-8.2)  g/dL


 


Albumin    4.3  (3.4-5.0)  g/dL


 


Globulin    3.3  (2.6-4.0)  g/dL


 


Albumin/Globulin Ratio    1.3  (0.9-1.6)  


 


Urine Color     


 


Urine Appearance     


 


Urine pH     (5.0-8.0)  


 


Ur Specific Gravity     (1.001-1.035)  


 


Urine Protein     (NEGATIVE)  mg/dL


 


Urine Glucose (UA)     (NEGATIVE)  mg/dL


 


Urine Ketones     (NEGATIVE)  mg/dL


 


Urine Occult Blood     (NEGATIVE)  


 


Urine Nitrite     (NEGATIVE)  


 


Urine Bilirubin     (NEGATIVE)  


 


Urine Urobilinogen     (<2.0)  EU/dL


 


Ur Leukocyte Esterase     (NEGATIVE)  


 


Urine RBC     (0-2/HPF)  


 


Urine WBC     (0-5/HPF)  


 


Amorphous Sediment     (NEGATIVE)  


 


Urine Bacteria     (NEGATIVE)  


 


Salicylates     (0-20)  mg/dL


 


Urine Opiates Screen     (NEGATIVE)  


 


Ur Oxycodone Screen     (NEGATIVE)  


 


Urine Methadone Screen     (NEGATIVE)  


 


Acetaminophen     ug/mL


 


Ur Barbiturates Screen     (NEGATIVE)  


 


Ur Phencyclidine Scrn     (NEGATIVE)  


 


Ur Amphetamine Screen     (NEGATIVE)  


 


U Methamphetamines Scrn     (NEGATIVE)  


 


U Benzodiazepines Scrn     (NEGATIVE)  


 


U Cocaine Metab Screen     (NEGATIVE)  


 


U Marijuana (THC) Screen     (NEGATIVE)  


 


Ethyl Alcohol     mg/dL














  06/16/19 06/16/19 06/16/19 Range/Units





  10:23 10:23 11:06 


 


WBC     (4.0-11.0)  K/uL


 


RBC     (4.30-5.90)  M/uL


 


Hgb     (12.0-16.0)  g/dL


 


Hct     (36.0-46.0)  %


 


MCV     (80.0-98.0)  fL


 


MCH     (27.0-32.0)  pg


 


MCHC     (31.0-37.0)  g/dL


 


RDW Std Deviation     (28.0-62.0)  fl


 


RDW Coeff of Crow     (11.0-15.0)  %


 


Plt Count     (150-400)  K/uL


 


MPV     (7.40-12.00)  fL


 


Neut % (Auto)     (48.0-80.0)  %


 


Lymph % (Auto)     (16.0-40.0)  %


 


Mono % (Auto)     (0.0-15.0)  %


 


Eos % (Auto)     (0.0-7.0)  %


 


Baso % (Auto)     (0.0-1.5)  %


 


Neut # (Auto)     (1.4-5.7)  K/uL


 


Lymph # (Auto)     (0.6-2.4)  K/uL


 


Mono # (Auto)     (0.0-0.8)  K/uL


 


Eos # (Auto)     (0.0-0.7)  K/uL


 


Baso # (Auto)     (0.0-0.1)  K/uL


 


Nucleated RBC %     /100WBC


 


Nucleated RBCs #     K/uL


 


INR     


 


Sodium     (136-145)  mmol/L


 


Potassium     (3.5-5.1)  mmol/L


 


Chloride     ()  mmol/L


 


Carbon Dioxide     (21.0-32.0)  mmol/L


 


BUN     (7.0-18.0)  mg/dL


 


Creatinine     (0.6-1.0)  mg/dL


 


Est Cr Clr Drug Dosing     mL/min


 


Estimated GFR (MDRD)     ml/min


 


Glucose     ()  mg/dL


 


Calcium     (8.5-10.1)  mg/dL


 


Total Bilirubin     (0.2-1.0)  mg/dL


 


AST     (15-37)  IU/L


 


ALT     (14-63)  IU/L


 


Alkaline Phosphatase     ()  U/L


 


Total Protein     (6.4-8.2)  g/dL


 


Albumin     (3.4-5.0)  g/dL


 


Globulin     (2.6-4.0)  g/dL


 


Albumin/Globulin Ratio     (0.9-1.6)  


 


Urine Color    YELLOW  


 


Urine Appearance    CLEAR  


 


Urine pH    7.0  (5.0-8.0)  


 


Ur Specific Gravity    1.015  (1.001-1.035)  


 


Urine Protein    NEGATIVE  (NEGATIVE)  mg/dL


 


Urine Glucose (UA)    NEGATIVE  (NEGATIVE)  mg/dL


 


Urine Ketones    TRACE H  (NEGATIVE)  mg/dL


 


Urine Occult Blood    NEGATIVE  (NEGATIVE)  


 


Urine Nitrite    POSITIVE H  (NEGATIVE)  


 


Urine Bilirubin    NEGATIVE  (NEGATIVE)  


 


Urine Urobilinogen    0.2  (<2.0)  EU/dL


 


Ur Leukocyte Esterase    SMALL H  (NEGATIVE)  


 


Urine RBC    NONE SEEN  (0-2/HPF)  


 


Urine WBC    15-25  (0-5/HPF)  


 


Amorphous Sediment    MODERATE  (NEGATIVE)  


 


Urine Bacteria    4+ H  (NEGATIVE)  


 


Salicylates   4.8   (0-20)  mg/dL


 


Urine Opiates Screen     (NEGATIVE)  


 


Ur Oxycodone Screen     (NEGATIVE)  


 


Urine Methadone Screen     (NEGATIVE)  


 


Acetaminophen   <2.0   ug/mL


 


Ur Barbiturates Screen     (NEGATIVE)  


 


Ur Phencyclidine Scrn     (NEGATIVE)  


 


Ur Amphetamine Screen     (NEGATIVE)  


 


U Methamphetamines Scrn     (NEGATIVE)  


 


U Benzodiazepines Scrn     (NEGATIVE)  


 


U Cocaine Metab Screen     (NEGATIVE)  


 


U Marijuana (THC) Screen     (NEGATIVE)  


 


Ethyl Alcohol  < 3.0    mg/dL














  06/16/19 Range/Units





  11:06 


 


WBC   (4.0-11.0)  K/uL


 


RBC   (4.30-5.90)  M/uL


 


Hgb   (12.0-16.0)  g/dL


 


Hct   (36.0-46.0)  %


 


MCV   (80.0-98.0)  fL


 


MCH   (27.0-32.0)  pg


 


MCHC   (31.0-37.0)  g/dL


 


RDW Std Deviation   (28.0-62.0)  fl


 


RDW Coeff of Crow   (11.0-15.0)  %


 


Plt Count   (150-400)  K/uL


 


MPV   (7.40-12.00)  fL


 


Neut % (Auto)   (48.0-80.0)  %


 


Lymph % (Auto)   (16.0-40.0)  %


 


Mono % (Auto)   (0.0-15.0)  %


 


Eos % (Auto)   (0.0-7.0)  %


 


Baso % (Auto)   (0.0-1.5)  %


 


Neut # (Auto)   (1.4-5.7)  K/uL


 


Lymph # (Auto)   (0.6-2.4)  K/uL


 


Mono # (Auto)   (0.0-0.8)  K/uL


 


Eos # (Auto)   (0.0-0.7)  K/uL


 


Baso # (Auto)   (0.0-0.1)  K/uL


 


Nucleated RBC %   /100WBC


 


Nucleated RBCs #   K/uL


 


INR   


 


Sodium   (136-145)  mmol/L


 


Potassium   (3.5-5.1)  mmol/L


 


Chloride   ()  mmol/L


 


Carbon Dioxide   (21.0-32.0)  mmol/L


 


BUN   (7.0-18.0)  mg/dL


 


Creatinine   (0.6-1.0)  mg/dL


 


Est Cr Clr Drug Dosing   mL/min


 


Estimated GFR (MDRD)   ml/min


 


Glucose   ()  mg/dL


 


Calcium   (8.5-10.1)  mg/dL


 


Total Bilirubin   (0.2-1.0)  mg/dL


 


AST   (15-37)  IU/L


 


ALT   (14-63)  IU/L


 


Alkaline Phosphatase   ()  U/L


 


Total Protein   (6.4-8.2)  g/dL


 


Albumin   (3.4-5.0)  g/dL


 


Globulin   (2.6-4.0)  g/dL


 


Albumin/Globulin Ratio   (0.9-1.6)  


 


Urine Color   


 


Urine Appearance   


 


Urine pH   (5.0-8.0)  


 


Ur Specific Gravity   (1.001-1.035)  


 


Urine Protein   (NEGATIVE)  mg/dL


 


Urine Glucose (UA)   (NEGATIVE)  mg/dL


 


Urine Ketones   (NEGATIVE)  mg/dL


 


Urine Occult Blood   (NEGATIVE)  


 


Urine Nitrite   (NEGATIVE)  


 


Urine Bilirubin   (NEGATIVE)  


 


Urine Urobilinogen   (<2.0)  EU/dL


 


Ur Leukocyte Esterase   (NEGATIVE)  


 


Urine RBC   (0-2/HPF)  


 


Urine WBC   (0-5/HPF)  


 


Amorphous Sediment   (NEGATIVE)  


 


Urine Bacteria   (NEGATIVE)  


 


Salicylates   (0-20)  mg/dL


 


Urine Opiates Screen  NEGATIVE  (NEGATIVE)  


 


Ur Oxycodone Screen  NEGATIVE  (NEGATIVE)  


 


Urine Methadone Screen  NEGATIVE  (NEGATIVE)  


 


Acetaminophen   ug/mL


 


Ur Barbiturates Screen  NEGATIVE  (NEGATIVE)  


 


Ur Phencyclidine Scrn  NEGATIVE  (NEGATIVE)  


 


Ur Amphetamine Screen  NEGATIVE  (NEGATIVE)  


 


U Methamphetamines Scrn  POSITIVE  (NEGATIVE)  


 


U Benzodiazepines Scrn  NEGATIVE  (NEGATIVE)  


 


U Cocaine Metab Screen  NEGATIVE  (NEGATIVE)  


 


U Marijuana (THC) Screen  NEGATIVE  (NEGATIVE)  


 


Ethyl Alcohol   mg/dL











Result Diagrams: 


 06/16/19 10:23





 06/16/19 10:23


Orders Last 24hrs: 


 Active Orders 24 hr











 Category Date Time Status


 


 Admission Status [Patient Status] [ADT] Stat ADT  06/16/19 12:19 Active


 


 CIWAA Assessment [RC] Q1H Care  06/16/19 13:51 Active


 


 Cardiac Monitoring [RC] CONTINUOUS Care  06/16/19 13:56 Active


 


 Oxygen Therapy [RC] PRN Care  06/16/19 13:51 Active


 


 Up With Assistance [RC] ASDIRECTED Care  06/16/19 13:51 Active


 


 VTE/DVT Education [RC] PER UNIT ROUTINE Care  06/16/19 13:51 Active


 


 Vaccines to be Administered [RC] PER UNIT ROUTINE Care  06/16/19 10:19 Active


 


 Vital Signs [RC] Q4H Care  06/16/19 13:51 Active


 


 Regular Diet [DIET] Diet  06/16/19 Dinner Active


 


 CBC WITH AUTO DIFF [HEME] AM Lab  06/17/19 05:11 Ordered


 


 CKMB [CHEM] Routine Lab  06/16/19 13:50 Received


 


 COMPREHENSIVE METABOLIC PN,CMP [CHEM] AM Lab  06/17/19 05:11 Ordered


 


 CULTURE BLOOD [BC] Stat Lab  06/16/19 13:35 Received


 


 CULTURE BLOOD [BC] Stat Lab  06/16/19 13:50 Received


 


 CULTURE URINE [RM] Stat Lab  06/16/19 11:06 Received


 


 HEPATITIS PANEL (4) [REF] Stat Lab  06/16/19 13:50 Received


 


 HIV12 AG/AB 4TH GEN [CHEM] Stat Lab  06/16/19 13:50 Received


 


 Acetaminophen [Tylenol] Med  06/16/19 13:51 Active





 650 mg PO Q4H PRN   


 


 Haloperidol Lactate [Haldol] Med  06/16/19 13:51 Active





 10 mg IM Q8H PRN   


 


 Heparin Sodium Med  06/16/19 14:00 Active





 5,000 units SUBCUT Q8H   


 


 Ondansetron [Zofran] Med  06/16/19 13:51 Active





 4 mg IVPUSH Q6H PRN   


 


 Sodium Chloride 0.9% [Normal Saline] 1,000 ml Med  06/16/19 14:00 Active





 IV ASDIRECTED   


 


 Temazepam [Restoril] Med  06/16/19 13:51 Active





 15 mg PO BEDTIME PRN   


 


 Blood Culture x2 Reflex Set [OM.PC] Stat Oth  06/16/19 13:34 Ordered


 


 VTE Mechanical Contraindications [AST] Per Unit Routine Oth  06/16/19 13:51 

Ordered


 


 Resuscitation Status Routine Resus Stat  06/16/19 13:51 Ordered








 Medication Orders





Acetaminophen (Tylenol)  650 mg PO Q4H PRN


   PRN Reason: Pain (Mild 1-3)/fever


Haloperidol Lactate (Haldol)  10 mg IM Q8H PRN


   PRN Reason: Agitation


Heparin Sodium (Porcine) (Heparin Sodium)  5,000 units SUBCUT Q8H MONSERRAT


Sodium Chloride (Normal Saline)  1,000 mls @ 125 mls/hr IV ASDIRECTED MONSERRAT


Ondansetron HCl (Zofran)  4 mg IVPUSH Q6H PRN


   PRN Reason: Nausea/Vomiting


Temazepam (Restoril)  15 mg PO BEDTIME PRN


   PRN Reason: Sleep

## 2019-06-16 NOTE — CR
INDICATION:



Chest pain, dyspnea, AMS



TECHNIQUE:



Chest 1 view. 



COMPARISON:



None 



FINDINGS:



The heart size and central vascular pattern are within the normal range. 

The lungs are clear of acute appearing infiltrates.  There are no pleural 

effusions evident. Gas is suspected within the proximal esophagus. 



IMPRESSION:



No acute cardiopulmonary disease is evident.



Dictated by Fuad Rizzo MD @ Jun 16 2019 11:51AM



Signed by Dr. Fuad Rizzo @ Jun 16 2019 11:58AM

## 2019-06-16 NOTE — CT
INDICATION:



Status post unwitnessed fall, altered mental status.



TECHNIQUE:



CT Head without contrast.



COMPARISON:



None 



FINDINGS:



Ventricles and sulci are normal in size and configuration. No extra axial 

collection.  No acute intracranial hemorrhage.  No mass effect or edema.  

No CT evidence of large territorial infarction.



    



Visualized paranasal sinuses and mastoid air cells are well aerated. No 

calvarial fracture. 



IMPRESSION:



No acute intracranial hemorrhage or mass effect.



Dictated by Sonya March MD @ 6/16/2019 11:55:23 AM



Please note that all CT scans at this facility use dose modulation, 

iterative reconstruction, and/or weight-based dosing when appropriate to 

reduce radiation dose to as low as reasonably achievable.



Dictated by: Sonya March MD @ 06/16/2019 11:55:30



(Electronically Signed)

## 2019-06-17 LAB
CHLORIDE SERPL-SCNC: 107 MMOL/L (ref 98–107)
SODIUM SERPL-SCNC: 138 MMOL/L (ref 136–145)

## 2019-06-17 RX ADMIN — DEXTROSE SCH: 10 SOLUTION INTRAVENOUS at 05:51

## 2023-06-04 NOTE — [id number]
<Gabriel Hogan - Last Filed: 06/17/19 13:23>





**Discharge Summary





- Hospital Course


Free Text/Narrative:: 





47 y/o female with history of polysubstance abuse who presented to the ER after 

she was apprehended after she was sound [place] 

break in.  She was found disheveled and incoherent. There was some confusion in 

regards to her true identity at first since she was incoherent. Urine drug 

screen was positive for methamphetamine. She was admitted for dehydration and 

UTI. She was started on ceftriaxone and urine culture result pending. She did 

relatively well during this hospitalization. She was alert and oriented x3 at 

time of discharge. She was provided with a script for Bactrim DS PO BID for 7 

days and advised to abstain from alcohol and any mood altering substances.





- Discharge Data


Discharge Date: 06/17/19


Discharge Disposition: Home, Self-Care 01


Condition: Fair





- Patient Instructions


Diet: Regular Diet as Tolerated


Activity: As Tolerated


Notify Provider of: Fever, Increased Pain, Swelling and Redness, Nausea and/or 

Vomiting


Other/Special Instructions: Please, abstain from alcohol and any mind altering 

substances.





- Discharge Plan


*PRESCRIPTION DRUG MONITORING PROGRAM REVIEWED*: Not Applicable


*COPY OF PRESCRIPTION DRUG MONITORING REPORT IN PATIENT REENA: Not Applicable


Prescriptions/Med Rec: 


Sulfamethoxazole/Trimethoprim [Bactrim Ds Tablet] 1 each PO BID 7 Days #14 

tablet


Home Medications: 


 Home Meds





busPIRone [Buspar] 10 mg PO 06/16/19 [History]


Sulfamethoxazole/Trimethoprim [Bactrim Ds Tablet] 1 each PO BID 7 Days #14 

tablet 06/17/19 [Rx]








Patient Handouts:  Urinary Tract Infection, Adult, Easy-to-Read, 

Sulfamethoxazole; Trimethoprim, [id number] tablets





- Discharge Summary/Plan Comment


DC Time >30 min.: No





- Patient Data


Vitals - Most Recent: 


 Last Vital Signs











Temp  37.5 C   06/17/19 07:35


 


Pulse  99   06/17/19 07:35


 


Resp  18   06/17/19 07:35


 


BP  118/67   06/17/19 07:35


 


Pulse Ox  98   06/17/19 07:35











Weight - Most Recent: 61.689 kg


I&O - Last 24 hours: 


 Intake & Output











 06/16/19 06/17/19 06/17/19





 22:59 06:59 14:59


 


Intake Total 550 1000 


 


Output Total 800  


 


Balance -250 1000 











Lab Results - Last 24 hrs: 


 Laboratory Results - last 24 hr











  06/16/19 06/16/19 06/16/19 Range/Units





  10:23 10:23 10:23 


 


WBC  8.55    (4.0-11.0)  K/uL


 


RBC  4.43    (4.30-5.90)  M/uL


 


Hgb  12.1    (12.0-16.0)  g/dL


 


Hct  36.9    (36.0-46.0)  %


 


MCV  83.3    (80.0-98.0)  fL


 


MCH  27.3    (27.0-32.0)  pg


 


MCHC  32.8    (31.0-37.0)  g/dL


 


RDW Std Deviation  55.7    (28.0-62.0)  fl


 


RDW Coeff of Crow  18 H    (11.0-15.0)  %


 


Plt Count  296    (150-400)  K/uL


 


MPV  9.20    (7.40-12.00)  fL


 


Neut % (Auto)  63.3    (48.0-80.0)  %


 


Lymph % (Auto)  27.1    (16.0-40.0)  %


 


Mono % (Auto)  8.5    (0.0-15.0)  %


 


Eos % (Auto)  0.7    (0.0-7.0)  %


 


Baso % (Auto)  0.4    (0.0-1.5)  %


 


Neut # (Auto)  5.4    (1.4-5.7)  K/uL


 


Lymph # (Auto)  2.3    (0.6-2.4)  K/uL


 


Mono # (Auto)  0.7    (0.0-0.8)  K/uL


 


Eos # (Auto)  0.1    (0.0-0.7)  K/uL


 


Baso # (Auto)  0.0    (0.0-0.1)  K/uL


 


Nucleated RBC %  0.0    /100WBC


 


Nucleated RBCs #  0    K/uL


 


INR   1.01   


 


Sodium    137  (136-145)  mmol/L


 


Potassium    4.3  (3.5-5.1)  mmol/L


 


Chloride    103  ()  mmol/L


 


Carbon Dioxide    26.9  (21.0-32.0)  mmol/L


 


BUN    23 H  (7.0-18.0)  mg/dL


 


Creatinine    1.0  (0.6-1.0)  mg/dL


 


Est Cr Clr Drug Dosing    15.27  mL/min


 


Estimated GFR (MDRD)    49.2  ml/min


 


Glucose    102  ()  mg/dL


 


Calcium    9.3  (8.5-10.1)  mg/dL


 


Total Bilirubin    1.2 H  (0.2-1.0)  mg/dL


 


AST    26  (15-37)  IU/L


 


ALT    28  (14-63)  IU/L


 


Alkaline Phosphatase    121 H  ()  U/L


 


CK-MB (CK-2)     (0-3.6)  ng/mL


 


Total Protein    7.6  (6.4-8.2)  g/dL


 


Albumin    4.3  (3.4-5.0)  g/dL


 


Globulin    3.3  (2.6-4.0)  g/dL


 


Albumin/Globulin Ratio    1.3  (0.9-1.6)  


 


Urine Color     


 


Urine Appearance     


 


Urine pH     (5.0-8.0)  


 


Ur Specific Gravity     (1.001-1.035)  


 


Urine Protein     (NEGATIVE)  mg/dL


 


Urine Glucose (UA)     (NEGATIVE)  mg/dL


 


Urine Ketones     (NEGATIVE)  mg/dL


 


Urine Occult Blood     (NEGATIVE)  


 


Urine Nitrite     (NEGATIVE)  


 


Urine Bilirubin     (NEGATIVE)  


 


Urine Urobilinogen     (<2.0)  EU/dL


 


Ur Leukocyte Esterase     (NEGATIVE)  


 


Urine RBC     (0-2/HPF)  


 


Urine WBC     (0-5/HPF)  


 


Amorphous Sediment     (NEGATIVE)  


 


Urine Bacteria     (NEGATIVE)  


 


Salicylates     (0-20)  mg/dL


 


Urine Opiates Screen     (NEGATIVE)  


 


Ur Oxycodone Screen     (NEGATIVE)  


 


Urine Methadone Screen     (NEGATIVE)  


 


Acetaminophen     ug/mL


 


Ur Barbiturates Screen     (NEGATIVE)  


 


Ur Phencyclidine Scrn     (NEGATIVE)  


 


Ur Amphetamine Screen     (NEGATIVE)  


 


U Methamphetamines Scrn     (NEGATIVE)  


 


U Benzodiazepines Scrn     (NEGATIVE)  


 


U Cocaine Metab Screen     (NEGATIVE)  


 


U Marijuana (THC) Screen     (NEGATIVE)  


 


Ethyl Alcohol     mg/dL


 


HIV 1&2 Ag/Ab, 4th Gen     (<1.0)  INDEX














  06/16/19 06/16/19 06/16/19 Range/Units





  10:23 10:23 11:06 


 


WBC     (4.0-11.0)  K/uL


 


RBC     (4.30-5.90)  M/uL


 


Hgb     (12.0-16.0)  g/dL


 


Hct     (36.0-46.0)  %


 


MCV     (80.0-98.0)  fL


 


MCH     (27.0-32.0)  pg


 


MCHC     (31.0-37.0)  g/dL


 


RDW Std Deviation     (28.0-62.0)  fl


 


RDW Coeff of Crow     (11.0-15.0)  %


 


Plt Count     (150-400)  K/uL


 


MPV     (7.40-12.00)  fL


 


Neut % (Auto)     (48.0-80.0)  %


 


Lymph % (Auto)     (16.0-40.0)  %


 


Mono % (Auto)     (0.0-15.0)  %


 


Eos % (Auto)     (0.0-7.0)  %


 


Baso % (Auto)     (0.0-1.5)  %


 


Neut # (Auto)     (1.4-5.7)  K/uL


 


Lymph # (Auto)     (0.6-2.4)  K/uL


 


Mono # (Auto)     (0.0-0.8)  K/uL


 


Eos # (Auto)     (0.0-0.7)  K/uL


 


Baso # (Auto)     (0.0-0.1)  K/uL


 


Nucleated RBC %     /100WBC


 


Nucleated RBCs #     K/uL


 


INR     


 


Sodium     (136-145)  mmol/L


 


Potassium     (3.5-5.1)  mmol/L


 


Chloride     ()  mmol/L


 


Carbon Dioxide     (21.0-32.0)  mmol/L


 


BUN     (7.0-18.0)  mg/dL


 


Creatinine     (0.6-1.0)  mg/dL


 


Est Cr Clr Drug Dosing     mL/min


 


Estimated GFR (MDRD)     ml/min


 


Glucose     ()  mg/dL


 


Calcium     (8.5-10.1)  mg/dL


 


Total Bilirubin     (0.2-1.0)  mg/dL


 


AST     (15-37)  IU/L


 


ALT     (14-63)  IU/L


 


Alkaline Phosphatase     ()  U/L


 


CK-MB (CK-2)     (0-3.6)  ng/mL


 


Total Protein     (6.4-8.2)  g/dL


 


Albumin     (3.4-5.0)  g/dL


 


Globulin     (2.6-4.0)  g/dL


 


Albumin/Globulin Ratio     (0.9-1.6)  


 


Urine Color    YELLOW  


 


Urine Appearance    CLEAR  


 


Urine pH    7.0  (5.0-8.0)  


 


Ur Specific Gravity    1.015  (1.001-1.035)  


 


Urine Protein    NEGATIVE  (NEGATIVE)  mg/dL


 


Urine Glucose (UA)    NEGATIVE  (NEGATIVE)  mg/dL


 


Urine Ketones    TRACE H  (NEGATIVE)  mg/dL


 


Urine Occult Blood    NEGATIVE  (NEGATIVE)  


 


Urine Nitrite    POSITIVE H  (NEGATIVE)  


 


Urine Bilirubin    NEGATIVE  (NEGATIVE)  


 


Urine Urobilinogen    0.2  (<2.0)  EU/dL


 


Ur Leukocyte Esterase    SMALL H  (NEGATIVE)  


 


Urine RBC    NONE SEEN  (0-2/HPF)  


 


Urine WBC    15-25  (0-5/HPF)  


 


Amorphous Sediment    MODERATE  (NEGATIVE)  


 


Urine Bacteria    4+ H  (NEGATIVE)  


 


Salicylates   4.8   (0-20)  mg/dL


 


Urine Opiates Screen     (NEGATIVE)  


 


Ur Oxycodone Screen     (NEGATIVE)  


 


Urine Methadone Screen     (NEGATIVE)  


 


Acetaminophen   <2.0   ug/mL


 


Ur Barbiturates Screen     (NEGATIVE)  


 


Ur Phencyclidine Scrn     (NEGATIVE)  


 


Ur Amphetamine Screen     (NEGATIVE)  


 


U Methamphetamines Scrn     (NEGATIVE)  


 


U Benzodiazepines Scrn     (NEGATIVE)  


 


U Cocaine Metab Screen     (NEGATIVE)  


 


U Marijuana (THC) Screen     (NEGATIVE)  


 


Ethyl Alcohol  < 3.0    mg/dL


 


HIV 1&2 Ag/Ab, 4th Gen     (<1.0)  INDEX














  06/16/19 06/16/19 06/16/19 Range/Units





  11:06 13:50 13:50 


 


WBC     (4.0-11.0)  K/uL


 


RBC     (4.30-5.90)  M/uL


 


Hgb     (12.0-16.0)  g/dL


 


Hct     (36.0-46.0)  %


 


MCV     (80.0-98.0)  fL


 


MCH     (27.0-32.0)  pg


 


MCHC     (31.0-37.0)  g/dL


 


RDW Std Deviation     (28.0-62.0)  fl


 


RDW Coeff of Crow     (11.0-15.0)  %


 


Plt Count     (150-400)  K/uL


 


MPV     (7.40-12.00)  fL


 


Neut % (Auto)     (48.0-80.0)  %


 


Lymph % (Auto)     (16.0-40.0)  %


 


Mono % (Auto)     (0.0-15.0)  %


 


Eos % (Auto)     (0.0-7.0)  %


 


Baso % (Auto)     (0.0-1.5)  %


 


Neut # (Auto)     (1.4-5.7)  K/uL


 


Lymph # (Auto)     (0.6-2.4)  K/uL


 


Mono # (Auto)     (0.0-0.8)  K/uL


 


Eos # (Auto)     (0.0-0.7)  K/uL


 


Baso # (Auto)     (0.0-0.1)  K/uL


 


Nucleated RBC %     /100WBC


 


Nucleated RBCs #     K/uL


 


INR     


 


Sodium     (136-145)  mmol/L


 


Potassium     (3.5-5.1)  mmol/L


 


Chloride     ()  mmol/L


 


Carbon Dioxide     (21.0-32.0)  mmol/L


 


BUN     (7.0-18.0)  mg/dL


 


Creatinine     (0.6-1.0)  mg/dL


 


Est Cr Clr Drug Dosing     mL/min


 


Estimated GFR (MDRD)     ml/min


 


Glucose     ()  mg/dL


 


Calcium     (8.5-10.1)  mg/dL


 


Total Bilirubin     (0.2-1.0)  mg/dL


 


AST     (15-37)  IU/L


 


ALT     (14-63)  IU/L


 


Alkaline Phosphatase     ()  U/L


 


CK-MB (CK-2)    15.7 H  (0-3.6)  ng/mL


 


Total Protein     (6.4-8.2)  g/dL


 


Albumin     (3.4-5.0)  g/dL


 


Globulin     (2.6-4.0)  g/dL


 


Albumin/Globulin Ratio     (0.9-1.6)  


 


Urine Color     


 


Urine Appearance     


 


Urine pH     (5.0-8.0)  


 


Ur Specific Gravity     (1.001-1.035)  


 


Urine Protein     (NEGATIVE)  mg/dL


 


Urine Glucose (UA)     (NEGATIVE)  mg/dL


 


Urine Ketones     (NEGATIVE)  mg/dL


 


Urine Occult Blood     (NEGATIVE)  


 


Urine Nitrite     (NEGATIVE)  


 


Urine Bilirubin     (NEGATIVE)  


 


Urine Urobilinogen     (<2.0)  EU/dL


 


Ur Leukocyte Esterase     (NEGATIVE)  


 


Urine RBC     (0-2/HPF)  


 


Urine WBC     (0-5/HPF)  


 


Amorphous Sediment     (NEGATIVE)  


 


Urine Bacteria     (NEGATIVE)  


 


Salicylates     (0-20)  mg/dL


 


Urine Opiates Screen  NEGATIVE    (NEGATIVE)  


 


Ur Oxycodone Screen  NEGATIVE    (NEGATIVE)  


 


Urine Methadone Screen  NEGATIVE    (NEGATIVE)  


 


Acetaminophen     ug/mL


 


Ur Barbiturates Screen  NEGATIVE    (NEGATIVE)  


 


Ur Phencyclidine Scrn  NEGATIVE    (NEGATIVE)  


 


Ur Amphetamine Screen  NEGATIVE    (NEGATIVE)  


 


U Methamphetamines Scrn  POSITIVE    (NEGATIVE)  


 


U Benzodiazepines Scrn  NEGATIVE    (NEGATIVE)  


 


U Cocaine Metab Screen  NEGATIVE    (NEGATIVE)  


 


U Marijuana (THC) Screen  NEGATIVE    (NEGATIVE)  


 


Ethyl Alcohol     mg/dL


 


HIV 1&2 Ag/Ab, 4th Gen   < 0.1   (<1.0)  INDEX














  06/17/19 06/17/19 Range/Units





  05:20 05:20 


 


WBC  4.15   (4.0-11.0)  K/uL


 


RBC  3.92 L   (4.30-5.90)  M/uL


 


Hgb  10.7 L   (12.0-16.0)  g/dL


 


Hct  32.8 L   (36.0-46.0)  %


 


MCV  83.7   (80.0-98.0)  fL


 


MCH  27.3   (27.0-32.0)  pg


 


MCHC  32.6   (31.0-37.0)  g/dL


 


RDW Std Deviation  56.6   (28.0-62.0)  fl


 


RDW Coeff of Crow  18 H   (11.0-15.0)  %


 


Plt Count  231   (150-400)  K/uL


 


MPV  9.50   (7.40-12.00)  fL


 


Neut % (Auto)  73.2   (48.0-80.0)  %


 


Lymph % (Auto)  16.6   (16.0-40.0)  %


 


Mono % (Auto)  5.8   (0.0-15.0)  %


 


Eos % (Auto)  3.9   (0.0-7.0)  %


 


Baso % (Auto)  0.5   (0.0-1.5)  %


 


Neut # (Auto)  3.0   (1.4-5.7)  K/uL


 


Lymph # (Auto)  0.7   (0.6-2.4)  K/uL


 


Mono # (Auto)  0.2   (0.0-0.8)  K/uL


 


Eos # (Auto)  0.2   (0.0-0.7)  K/uL


 


Baso # (Auto)  0.0   (0.0-0.1)  K/uL


 


Nucleated RBC %  0.0   /100WBC


 


Nucleated RBCs #  0   K/uL


 


INR    


 


Sodium   138  (136-145)  mmol/L


 


Potassium   3.9  (3.5-5.1)  mmol/L


 


Chloride   107  ()  mmol/L


 


Carbon Dioxide   24.7  (21.0-32.0)  mmol/L


 


BUN   15  (7.0-18.0)  mg/dL


 


Creatinine   0.6  (0.6-1.0)  mg/dL


 


Est Cr Clr Drug Dosing   110.30  mL/min


 


Estimated GFR (MDRD)   > 60.0  ml/min


 


Glucose   86  ()  mg/dL


 


Calcium   8.4 L  (8.5-10.1)  mg/dL


 


Total Bilirubin   1.1 H  (0.2-1.0)  mg/dL


 


AST   27  (15-37)  IU/L


 


ALT   24  (14-63)  IU/L


 


Alkaline Phosphatase   93  ()  U/L


 


CK-MB (CK-2)    (0-3.6)  ng/mL


 


Total Protein   6.1 L  (6.4-8.2)  g/dL


 


Albumin   3.3 L  (3.4-5.0)  g/dL


 


Globulin   2.8  (2.6-4.0)  g/dL


 


Albumin/Globulin Ratio   1.2  (0.9-1.6)  


 


Urine Color    


 


Urine Appearance    


 


Urine pH    (5.0-8.0)  


 


Ur Specific Gravity    (1.001-1.035)  


 


Urine Protein    (NEGATIVE)  mg/dL


 


Urine Glucose (UA)    (NEGATIVE)  mg/dL


 


Urine Ketones    (NEGATIVE)  mg/dL


 


Urine Occult Blood    (NEGATIVE)  


 


Urine Nitrite    (NEGATIVE)  


 


Urine Bilirubin    (NEGATIVE)  


 


Urine Urobilinogen    (<2.0)  EU/dL


 


Ur Leukocyte Esterase    (NEGATIVE)  


 


Urine RBC    (0-2/HPF)  


 


Urine WBC    (0-5/HPF)  


 


Amorphous Sediment    (NEGATIVE)  


 


Urine Bacteria    (NEGATIVE)  


 


Salicylates    (0-20)  mg/dL


 


Urine Opiates Screen    (NEGATIVE)  


 


Ur Oxycodone Screen    (NEGATIVE)  


 


Urine Methadone Screen    (NEGATIVE)  


 


Acetaminophen    ug/mL


 


Ur Barbiturates Screen    (NEGATIVE)  


 


Ur Phencyclidine Scrn    (NEGATIVE)  


 


Ur Amphetamine Screen    (NEGATIVE)  


 


U Methamphetamines Scrn    (NEGATIVE)  


 


U Benzodiazepines Scrn    (NEGATIVE)  


 


U Cocaine Metab Screen    (NEGATIVE)  


 


U Marijuana (THC) Screen    (NEGATIVE)  


 


Ethyl Alcohol    mg/dL


 


HIV 1&2 Ag/Ab, 4th Gen    (<1.0)  INDEX











Med Orders - Current: 


 Current Medications





Acetaminophen (Tylenol)  650 mg PO Q4H PRN


   PRN Reason: Pain (Mild 1-3)/fever


Haloperidol Lactate (Haldol)  10 mg IM Q8H PRN


   PRN Reason: Agitation


Heparin Sodium (Porcine) (Heparin Sodium)  5,000 units SUBCUT Q8H [facility]


   Last Admin: 06/17/19 05:51 Dose:  Not Given


Sodium Chloride (Normal Saline)  1,000 mls @ 125 mls/hr IV ASDIRECTED [facility]


   Last Admin: 06/17/19 04:14 Dose:  125 mls/hr


Ceftriaxone Sodium/Dextrose 1 (gm/ Premix)  50 mls @ 100 mls/hr IV Q24H [facility]


   Last Admin: 06/17/19 08:13 Dose:  100 mls/hr


Ondansetron HCl (Zofran)  4 mg IVPUSH Q6H PRN


   PRN Reason: Nausea/Vomiting


Temazepam (Restoril)  15 mg PO BEDTIME PRN


   PRN Reason: Sleep





Discontinued Medications





Diphenhydramine HCl (Benadryl)  50 mg IVPUSH ONETIME ONE


   Stop: 06/16/19 10:29


   Last Admin: 06/16/19 10:41 Dose:  50 mg


Diphtheria/Tetanus/Acell Pertussis (Adacel)  0.5 ml IM .ONCE ONE


   Stop: 06/16/19 10:19


   Last Admin: 06/16/19 10:41 Dose:  0.5 ml


Haloperidol Lactate (Haldol)  10 mg IM ONETIME ONE


   Stop: 06/16/19 10:30


   Last Admin: 06/16/19 10:49 Dose:  10 mg


Sodium Chloride (Normal Saline)  1,000 mls @ 999 mls/hr IV STAT ONE


   Stop: 06/16/19 11:15


   Last Admin: 06/16/19 10:30 Dose:  999 mls/hr


Ceftriaxone Sodium/Dextrose 1 (gm/ Premix)  50 mls @ 100 mls/hr IV ONETIME ONE


   Stop: 06/16/19 12:07


   Last Admin: 06/16/19 12:17 Dose:  100 mls/hr











*Q Meaningful Use (DIS)





- VTE *Q


VTE Mechanical Contraindications *Q: At Risk for Falls





<Zack Newby - Last Filed: 06/17/19 17:08>





**Discharge Summary





- Hospital Course


HPI Initial Comments: 





I have seen and examined to patient independently of medical resident, Gabriel Thompson MD. I have discussed the case for care of this patient with him. I 

have reviewed and approve of the plan of care as outlined by medical resident. 

Please see orders. 








- Patient Data


Vitals - Most Recent: 


 Last Vital Signs











Temp  37.5 C   06/17/19 07:35


 


Pulse  99   06/17/19 07:35


 


Resp  18   06/17/19 07:35


 


BP  118/67   06/17/19 07:35


 


Pulse Ox  98   06/17/19 07:35











I&O - Last 24 hours: 


 Intake & Output











 06/17/19 06/17/19 06/17/19





 06:59 14:59 22:59


 


Intake Total 1000  


 


Balance 1000  











Lab Results - Last 24 hrs: 


 Laboratory Results - last 24 hr











  06/17/19 06/17/19 Range/Units





  05:20 05:20 


 


WBC  4.15   (4.0-11.0)  K/uL


 


RBC  3.92 L   (4.30-5.90)  M/uL


 


Hgb  10.7 L   (12.0-16.0)  g/dL


 


Hct  32.8 L   (36.0-46.0)  %


 


MCV  83.7   (80.0-98.0)  fL


 


MCH  27.3   (27.0-32.0)  pg


 


MCHC  32.6   (31.0-37.0)  g/dL


 


RDW Std Deviation  56.6   (28.0-62.0)  fl


 


RDW Coeff of Crow  18 H   (11.0-15.0)  %


 


Plt Count  231   (150-400)  K/uL


 


MPV  9.50   (7.40-12.00)  fL


 


Neut % (Auto)  73.2   (48.0-80.0)  %


 


Lymph % (Auto)  16.6   (16.0-40.0)  %


 


Mono % (Auto)  5.8   (0.0-15.0)  %


 


Eos % (Auto)  3.9   (0.0-7.0)  %


 


Baso % (Auto)  0.5   (0.0-1.5)  %


 


Neut # (Auto)  3.0   (1.4-5.7)  K/uL


 


Lymph # (Auto)  0.7   (0.6-2.4)  K/uL


 


Mono # (Auto)  0.2   (0.0-0.8)  K/uL


 


Eos # (Auto)  0.2   (0.0-0.7)  K/uL


 


Baso # (Auto)  0.0   (0.0-0.1)  K/uL


 


Nucleated RBC %  0.0   /100WBC


 


Nucleated RBCs #  0   K/uL


 


Sodium   138  (136-145)  mmol/L


 


Potassium   3.9  (3.5-5.1)  mmol/L


 


Chloride   107  ()  mmol/L


 


Carbon Dioxide   24.7  (21.0-32.0)  mmol/L


 


BUN   15  (7.0-18.0)  mg/dL


 


Creatinine   0.6  (0.6-1.0)  mg/dL


 


Est Cr Clr Drug Dosing   110.30  mL/min


 


Estimated GFR (MDRD)   > 60.0  ml/min


 


Glucose   86  ()  mg/dL


 


Calcium   8.4 L  (8.5-10.1)  mg/dL


 


Total Bilirubin   1.1 H  (0.2-1.0)  mg/dL


 


AST   27  (15-37)  IU/L


 


ALT   24  (14-63)  IU/L


 


Alkaline Phosphatase   93  ()  U/L


 


Total Protein   6.1 L  (6.4-8.2)  g/dL


 


Albumin   3.3 L  (3.4-5.0)  g/dL


 


Globulin   2.8  (2.6-4.0)  g/dL


 


Albumin/Globulin Ratio   1.2  (0.9-1.6)  











[facility] Results - Last 24 hrs: 


 Microbiology











 06/16/19 13:50 Aerobic Blood Culture - Preliminary





 Blood - Venous - Lab Draw    NO GROWTH AFTER 1 DAY





 Anaerobic Blood Culture - Preliminary





    NO GROWTH AFTER 1 DAY


 


 06/16/19 13:35 Aerobic Blood Culture - Preliminary





 Blood - Venous    NO GROWTH AFTER 1 DAY





 Anaerobic Blood Culture - Preliminary





    NO GROWTH AFTER 1 DAY











Med Orders - Current: 


 Current Medications








Discontinued Medications





Acetaminophen (Tylenol)  650 mg PO Q4H PRN


   PRN Reason: Pain (Mild 1-3)/fever


Diphenhydramine HCl (Benadryl)  50 mg IVPUSH ONETIME ONE


   Stop: 06/16/19 10:29


   Last Admin: 06/16/19 10:41 Dose:  50 mg


Diphtheria/Tetanus/Acell Pertussis (Adacel)  0.5 ml IM .ONCE ONE


   Stop: 06/16/19 10:19


   Last Admin: 06/16/19 10:41 Dose:  0.5 ml


Haloperidol Lactate (Haldol)  10 mg IM ONETIME ONE


   Stop: 06/16/19 10:30


   Last Admin: 06/16/19 10:49 Dose:  10 mg


Haloperidol Lactate (Haldol)  10 mg IM Q8H PRN


   PRN Reason: Agitation


Heparin Sodium (Porcine) (Heparin Sodium)  5,000 units SUBCUT Q8H [facility]


   Last Admin: 06/17/19 05:51 Dose:  Not Given


Sodium Chloride (Normal Saline)  1,000 mls @ 999 mls/hr IV STAT ONE


   Stop: 06/16/19 11:15


   Last Admin: 06/16/19 10:30 Dose:  999 mls/hr


Ceftriaxone Sodium/Dextrose 1 (gm/ Premix)  50 mls @ 100 mls/hr IV ONETIME ONE


   Stop: 06/16/19 12:07


   Last Admin: 06/16/19 12:17 Dose:  100 mls/hr


Sodium Chloride (Normal Saline)  1,000 mls @ 125 mls/hr IV ASDIRECTED [facility]


   Last Admin: 06/17/19 04:14 Dose:  125 mls/hr


Ceftriaxone Sodium/Dextrose 1 (gm/ Premix)  50 mls @ 100 mls/hr IV Q24H [facility]


   Last Admin: 06/17/19 08:13 Dose:  100 mls/hr


Ondansetron HCl (Zofran)  4 mg IVPUSH Q6H PRN


   PRN Reason: Nausea/Vomiting


Temazepam (Restoril)  15 mg PO BEDTIME PRN


   PRN Reason: Sleep
(0) Absent